# Patient Record
Sex: MALE | Race: WHITE | NOT HISPANIC OR LATINO | URBAN - METROPOLITAN AREA
[De-identification: names, ages, dates, MRNs, and addresses within clinical notes are randomized per-mention and may not be internally consistent; named-entity substitution may affect disease eponyms.]

---

## 2020-07-20 ENCOUNTER — TELEPHONE ENCOUNTER (OUTPATIENT)
Dept: URBAN - METROPOLITAN AREA CLINIC 35 | Facility: CLINIC | Age: 60
End: 2020-07-20

## 2020-07-20 ENCOUNTER — OFFICE VISIT (OUTPATIENT)
Dept: URBAN - METROPOLITAN AREA CLINIC 33 | Facility: CLINIC | Age: 60
End: 2020-07-20

## 2020-07-20 VITALS
SYSTOLIC BLOOD PRESSURE: 142 MMHG | OXYGEN SATURATION: 97 % | BODY MASS INDEX: 28.44 KG/M2 | WEIGHT: 210 LBS | DIASTOLIC BLOOD PRESSURE: 88 MMHG | TEMPERATURE: 97 F | HEIGHT: 72 IN | HEART RATE: 76 BPM | RESPIRATION RATE: 17 BRPM

## 2020-07-20 RX ORDER — AMLODIPINE BESYLATE 5 MG/1
1 TABLET TABLET ORAL ONCE A DAY
Qty: 30 | Status: ACTIVE | COMMUNITY

## 2020-07-20 NOTE — HPI-MIGRATED HPI
;   ;     Cirrhosis : Patient presents today at the request of PCP Dr. Nolasco for a consultation for  cirrhosis of liver.   Liver biopsy performed at [facility] ordered by GI Dr. Shane Dutton and performed on (7/31/2019) with findings consistent with that of cirrhosis and moderate steatosis.  The overall findings in the biopsy are suggestive of a toxic/metabolic pattern.  Plasma cells arise probably due to autoimmune or infectious components, although piecemeal necrosis or prominent activity is not seen.    He began taking Uloric 8 years ago and labs were taken every 6 months noting liver enzymes continued to elevate.  He discontinued the Uloric and liver enzymes decreased.  Complications of liver disease:  Jaundice: No Hepatic Encephalopathy: No Ascites: No Spontaneous Bacterial Peritonitis: No Variceal Hemorrhage: No Portal Vein Thrombosis: No Muscle Mass Loss: No Weight loss: No Sleep Habits: are good Sleep Habits    EGD (11/14/2019) Trace esophageal varices in the distal third of esophagus. All columns completely flattened with insufflation. No high risk stigmata. Mild nonerosive gastritis .  ;   Elevated Liver Enzymes : Patient admits a 8 year history of elevated liver enzymes which has fluctuated with the treatment of Gout with Uloric.  He began taking Uloric 8 years ago and labs were taken every 6 months noting liver enzymes continued to elevate.  He discontinued the Uloric and liver enzymes decreased.   Lost recent labs completed with PCP on (6/18/2020) CMP: AST (High) 41, ALT (High) 55. Also Amylase (High) 120, Lipase (High) 297.   Patient currently denies jaundice, chills, RUQ pains, dizziness, easy bruising, or increase fatigue.     RISK FACTORS:   Admit Alcohol use over the course of 8-10 years he would drink at least 6 drinks on the weekends consisting of Bud light, (Burbon or vodka).  Over the past 10 years he will drink less than 2 per month   - beer   Denies drugs, travel outside the [place], NSAID use, protein supplements, tattoos, piercings,  service, blood transfusion, family history of liver disease or cancer,  personal exposure to liver diseases, long term, rehab. ;

## 2020-07-24 ENCOUNTER — TELEPHONE ENCOUNTER (OUTPATIENT)
Dept: URBAN - METROPOLITAN AREA CLINIC 35 | Facility: CLINIC | Age: 60
End: 2020-07-24

## 2020-08-04 LAB
AFP-L3: (no result)
AFP: 4.9
ALBUMIN/GLOBULIN RATIO: 1.1
ALBUMIN: 4.2
ALKALINE PHOSPHATASE: 68
ALT: 36
ANA PATTERN: (no result)
ANA PATTERN: (no result)
ANA SCREEN, IFA: POSITIVE
ANA TITER: (no result)
ANA TITER: (no result)
AST: 23
BILIRUBIN, TOTAL: 0.9
BUN/CREATININE RATIO: (no result)
CALCIUM: 9.2
CARBON DIOXIDE: 23
CHLORIDE: 106
CREATININE: 1.1
EGFR AFRICAN AMERICAN: 85
EGFR NON-AFR. AMERICAN: 74
GLOBULIN: 4
GLUCOSE: 83
IMMUNOGLOBULIN A: 288
IMMUNOGLOBULIN G: 2434
IMMUNOGLOBULIN M: 127
INR: 1
LKM-1 ANTIBODY (IGG): <=20
MITOCHONDRIAL AB SCREEN: NEGATIVE
POTASSIUM: 4
PROTEIN, TOTAL: 8.2
PT: 10.2
SMOOTH MUSCLE AB SCREEN: NEGATIVE
SODIUM: 138
SOLUBLE LIVER ANTIGEN (SLA) AUTOANTIBODY: <20.1
UREA NITROGEN (BUN): 20

## 2020-08-17 ENCOUNTER — OFFICE VISIT (OUTPATIENT)
Dept: URBAN - METROPOLITAN AREA CLINIC 33 | Facility: CLINIC | Age: 60
End: 2020-08-17

## 2020-08-17 VITALS
BODY MASS INDEX: 29.12 KG/M2 | DIASTOLIC BLOOD PRESSURE: 100 MMHG | HEIGHT: 72 IN | HEART RATE: 69 BPM | WEIGHT: 215 LBS | TEMPERATURE: 96.8 F | SYSTOLIC BLOOD PRESSURE: 140 MMHG

## 2020-08-17 PROBLEM — 274774002 ELEVATED LEVELS OF TRANSAMINASE & LACTIC ACID DEHYDROGENASE: Status: ACTIVE | Noted: 2020-07-26

## 2020-08-17 RX ORDER — AMLODIPINE BESYLATE 5 MG/1
1 TABLET TABLET ORAL ONCE A DAY
Qty: 30 | Status: ACTIVE | COMMUNITY

## 2020-08-17 NOTE — HPI-MIGRATED HPI
;   ;     Cirrhosis : Patient presents today to review labs and follow up of cirrhosis of liver.   Jaundice: No Hepatic Encephalopathy: No Ascites: No Spontaneous Bacterial Peritonitis: No Variceal Hemorrhage: No Portal Vein Thrombosis: No Muscle Mass Loss: No Weight loss: No Sleep Habits: are good Sleep Habits    MELD Score: 7 (7/29/2020)  He does take prednisone  and advil -2 ,once every few months for gout flares , last episode was on 8/15/2020  Last visit (07/20/2020) Patient presents today at the request of PCP Dr. Nolasco for a consultation for  cirrhosis of liver.   Liver biopsy performed at [facility] ordered by GI Dr. Shane Dutton and performed on (7/31/2019) with findings consistent with that of cirrhosis and moderate steatosis.  The overall findings in the biopsy are suggestive of a toxic/metabolic pattern.  Plasma cells arise probably due to autoimmune or infectious components, although piecemeal necrosis or prominent activity is not seen.    He began taking Uloric 8 years ago and labs were taken every 6 months noting liver enzymes continued to elevate.  He discontinued the Uloric and liver enzymes decreased.  Complications of liver disease:  Jaundice: No Hepatic Encephalopathy: No Ascites: No Spontaneous Bacterial Peritonitis: No Variceal Hemorrhage: No Portal Vein Thrombosis: No Muscle Mass Loss: No Weight loss: No Sleep Habits: are good Sleep Habits    EGD (11/14/2019) Trace esophageal varices in the distal third of esophagus. All columns completely flattened with insufflation. No high risk stigmata. Mild nonerosive gastritis .  ;   Elevated Liver Enzymes : Patient currently denies jaundice, chills, RUQ pains, dizziness, easy bruising, fatigue.   He currently reports 1-2 bowel movements a day with no strain. His stools are formed with no blood or mucus present at this time.   RISK FACTORS: Denies drug use, travel outside the [place], NSAIDs, protein supplements, tattoos, piercings,  service, blood transfusion, family history of liver disease or cancer, personal exposure to liver diseases, [facility], rehab.  Admits occasional alcohol use.   Last visit (07/20/2020)Patient admits a 8 year history of elevated liver enzymes which has fluctuated with the treatment of Gout with Uloric.  He began taking Uloric 8 years ago and labs were taken every 6 months noting liver enzymes continued to elevate.  He discontinued the Uloric and liver enzymes decreased.   Lost recent labs completed with PCP on (6/18/2020) CMP: AST (High) 41, ALT (High) 55. Also Amylase (High) 120, Lipase (High) 297.   Patient currently denies jaundice, chills, RUQ pains, dizziness, easy bruising, or increase fatigue.     RISK FACTORS:   Admit Alcohol use over the course of 8-10 years he would drink at least 6 drinks on the weekends consisting of Bud light, (Burbon or vodka).  Over the past 10 years he will drink less than 2 per month   - beer   Denies drugs, travel outside the [place], NSAID use, protein supplements, tattoos, piercings,  service, blood transfusion, family history of liver disease or cancer,  personal exposure to liver diseases, [facility], rehab. ;

## 2020-11-12 ENCOUNTER — TELEPHONE ENCOUNTER (OUTPATIENT)
Dept: URBAN - METROPOLITAN AREA CLINIC 35 | Facility: CLINIC | Age: 60
End: 2020-11-12

## 2020-11-16 ENCOUNTER — TELEPHONE ENCOUNTER (OUTPATIENT)
Dept: URBAN - METROPOLITAN AREA CLINIC 35 | Facility: CLINIC | Age: 60
End: 2020-11-16

## 2020-11-16 ENCOUNTER — OFFICE VISIT (OUTPATIENT)
Dept: URBAN - METROPOLITAN AREA CLINIC 33 | Facility: CLINIC | Age: 60
End: 2020-11-16

## 2020-11-16 VITALS
HEART RATE: 67 BPM | HEIGHT: 72 IN | BODY MASS INDEX: 29.12 KG/M2 | RESPIRATION RATE: 17 BRPM | DIASTOLIC BLOOD PRESSURE: 80 MMHG | SYSTOLIC BLOOD PRESSURE: 140 MMHG | TEMPERATURE: 96.3 F | OXYGEN SATURATION: 97 % | WEIGHT: 215 LBS

## 2020-11-16 RX ORDER — AMLODIPINE BESYLATE 5 MG/1
1 TABLET TABLET ORAL ONCE A DAY
Qty: 30 | Status: ACTIVE | COMMUNITY

## 2020-11-16 RX ORDER — COLCHICINE 0.6 MG/1
1 TABLET TABLET, FILM COATED ORAL
Qty: 15 | Status: ACTIVE | COMMUNITY

## 2020-11-16 RX ORDER — FEBUXOSTAT 40 MG/1
1 TABLET TABLET ORAL ONCE A DAY
Qty: 30 | Status: ACTIVE | COMMUNITY

## 2020-11-16 NOTE — HPI-MIGRATED HPI
;   ;   ;     Cirrhosis : Patient presents today for a follow up of Cirrhosis.   Patient had consultation with Dr CANDY Mojica, [facility] for evaluating the etiology of Cirrhosis on 9/15/2020 with plan noting vaccination's indicated if not up to date pneumococcus, influenza (annually) diphtheria, pertussis and tetanus.  Nutritional education, diet modification 2 gram sodium diet, discuss avoiding raw seafood/shellfish; lean protein intake encouraged. No NSAID's; Tylenol less than 2 grams/daily preferred for pain management.  Will review  slides from Liver biopsy performed on (7/31/2019)   MELD Score: 9 (9/15/2020) [place] CARROLL Fibrosis Stage 4  (9/28/2020) [place]   Complications of liver disease: Jaundice: No Hepatic Encephalopathy: No Ascites: No Spontaneous Bacterial Peritonitis: No Variceal Hemorrhage: No Portal Vein Thrombosis: No Muscle Mass Loss: No Weight loss: No Sleep Habits: are good, sleeping 6 consecutive hours per night.  Admit a history of heavy alcohol use with drinking a 24 pk of beer on the weekends and a DUI in 1999/2000.  Quit drinking alcohol 5 years ago, then had a few beers in Jan. 2020.     MELD Score: 8 (INR from 9/15/2020, all others from 10/26/2020)   Last visit (08/17/2020): Patient presents today to review labs and follow up of cirrhosis of liver.   Jaundice: No Hepatic Encephalopathy: No Ascites: No Spontaneous Bacterial Peritonitis: No Variceal Hemorrhage: No Portal Vein Thrombosis: No Muscle Mass Loss: No Weight loss: No Sleep Habits: are good Sleep Habits    MELD Score: 7 (7/29/2020)  He does take prednisone  and Advil -2 ,once every few months for gout flares , last episode was on 8/15/2020  Last visit (07/20/2020) Patient presents today at the request of PCP Dr. Nolasco for a consultation for  cirrhosis of liver.   Liver biopsy performed at [facility] ordered by GI Dr. Shane Dutton and performed on (7/31/2019) with findings consistent with that of cirrhosis and moderate steatosis.  The overall findings in the biopsy are suggestive of a toxic/metabolic pattern.  Plasma cells arise probably due to autoimmune or infectious components, although piecemeal necrosis or prominent activity is not seen.    He began taking Uloric 8 years ago and labs were taken every 6 months noting liver enzymes continued to elevate.  He discontinued the Uloric and liver enzymes decreased.  Complications of liver disease:  Jaundice: No Hepatic Encephalopathy: No Ascites: No Spontaneous Bacterial Peritonitis: No Variceal Hemorrhage: No Portal Vein Thrombosis: No Muscle Mass Loss: No Weight loss: No Sleep Habits: are good Sleep Habits    EGD (11/14/2019) Trace esophageal varices in the distal third of esophagus. All columns completely flattened with insufflation. No high risk stigmata. Mild nonerosive gastritis .  ;   Change in Bowel habits : Admit change in bowel habits over the past week.  Described as loose and watery evacuations 3-4 times a day with associated fecal urgency.  He took Imodium 3 QD on Saturday with relief.  Since then he has had 2 formed to loose bowel movements per day and no longer experiencing fecal urgency.   Previous bowel habits were normal and formed.   He denies recently drinking lake or well water, any changes in their medications, or taking any antibiotics in the last 6 months.;   Elevated Liver Enzymes : Patient stated that he had labs completed with Dr. Frazier's office 10/26/2020 CMP (WNL)  AST26, ALT 41, Alk phos 98, Total bilirubin 0.7   Patient currently denies jaundice, chills, RUQ pains, dizziness, easy bruising, or increase fatigue.    Last vsit: (8/17/2020): Patient currently denies jaundice, chills, RUQ pains, dizziness, easy bruising, fatigue.   He currently reports 1-2 bowel movements a day with no strain. His stools are formed with no blood or mucus present at this time.   RISK FACTORS: Denies drug use, travel outside the [place], NSAIDs, protein supplements, tattoos, piercings,  service, blood transfusion, family history of liver disease or cancer, personal exposure to liver diseases, [facility], rehab.  Admits occasional alcohol use.   Last visit (07/20/2020)Patient admits a 8 year history of elevated liver enzymes which has fluctuated with the treatment of Gout with Uloric.  He began taking Uloric 8 years ago and labs were taken every 6 months noting liver enzymes continued to elevate.  He discontinued the Uloric and liver enzymes decreased.   Lost recent labs completed with PCP on (6/18/2020) CMP: AST (High) 41, ALT (High) 55. Also Amylase (High) 120, Lipase (High) 297.   Patient currently denies jaundice, chills, RUQ pains, dizziness, easy bruising, or increase fatigue.     RISK FACTORS:   Admit Alcohol use over the course of 8-10 years he would drink at least 6 drinks on the weekends consisting of Bud light, (Burbon or vodka).  Over the past 10 years he will drink less than 2 per month   - beer   Denies drugs, travel outside the [place], NSAID use, protein supplements, tattoos, piercings,  service, blood transfusion, family history of liver disease or cancer,  personal exposure to liver diseases, [facility], rehab. ;

## 2020-11-17 ENCOUNTER — LAB OUTSIDE AN ENCOUNTER (OUTPATIENT)
Dept: URBAN - METROPOLITAN AREA CLINIC 35 | Facility: CLINIC | Age: 60
End: 2020-11-17

## 2020-11-21 LAB
GASTROINTESTINAL PATHOGEN: (no result)
H. PYLORI (EIA) - QDX: NEGATIVE
PANCREATICELASTASE ELISA, STOOL: (no result)

## 2020-11-23 ENCOUNTER — TELEPHONE ENCOUNTER (OUTPATIENT)
Dept: URBAN - METROPOLITAN AREA CLINIC 35 | Facility: CLINIC | Age: 60
End: 2020-11-23

## 2020-11-23 PROBLEM — 707724006: Status: ACTIVE | Noted: 2020-11-23

## 2020-12-15 ENCOUNTER — TELEPHONE ENCOUNTER (OUTPATIENT)
Dept: URBAN - METROPOLITAN AREA CLINIC 35 | Facility: CLINIC | Age: 60
End: 2020-12-15

## 2021-01-11 ENCOUNTER — OFFICE VISIT (OUTPATIENT)
Dept: URBAN - METROPOLITAN AREA CLINIC 33 | Facility: CLINIC | Age: 61
End: 2021-01-11

## 2021-01-11 ENCOUNTER — LAB OUTSIDE AN ENCOUNTER (OUTPATIENT)
Dept: URBAN - METROPOLITAN AREA CLINIC 33 | Facility: CLINIC | Age: 61
End: 2021-01-11

## 2021-01-11 ENCOUNTER — TELEPHONE ENCOUNTER (OUTPATIENT)
Dept: URBAN - METROPOLITAN AREA CLINIC 35 | Facility: CLINIC | Age: 61
End: 2021-01-11

## 2021-01-11 VITALS — BODY MASS INDEX: 29.12 KG/M2 | HEIGHT: 72 IN | WEIGHT: 215 LBS | TEMPERATURE: 97.8 F

## 2021-01-11 RX ORDER — THIAMINE HCL 100 MG
1 TABLET WITH A MEAL TABLET ORAL ONCE A DAY
Qty: 30 | Status: ACTIVE | COMMUNITY

## 2021-01-11 RX ORDER — FEBUXOSTAT 40 MG/1
1 TABLET TABLET ORAL ONCE A DAY
Qty: 30 | Status: ACTIVE | COMMUNITY

## 2021-01-11 RX ORDER — AMLODIPINE BESYLATE 5 MG/1
1 TABLET TABLET ORAL ONCE A DAY
Qty: 30 | Status: ACTIVE | COMMUNITY

## 2021-01-11 RX ORDER — COLCHICINE 0.6 MG/1
1 TABLET TABLET, FILM COATED ORAL
Qty: 15 | Status: ACTIVE | COMMUNITY

## 2021-01-11 NOTE — HPI-MIGRATED HPI
;   ;   ;     Cirrhosis : Patient presents today for a follow up of Cirrhosis. He continue to deny alcohol use.  He has stopped the use of  Vitamin E.   He continue to take Uloric, since patient declined IV treatment.   MELD Score: 8 (INR From 8/4/2020)    Complications of liver disease: Jaundice: No Hepatic Encephalopathy: No Ascites: No Spontaneous Bacterial Peritonitis: No Variceal Hemorrhage: No Portal Vein Thrombosis: No Muscle Mass Loss: No Weight loss: No Sleep Habits: are good, sleeping 6 consecutive hours per night.     Last visit (11/16/2020) Patient presents today for a follow up of Cirrhosis.   Patient had consultation with Dr CANDY Mojica, [facility] for evaluating the etiology of Cirrhosis on 9/15/2020 with plan noting vaccination's indicated if not up to date pneumococcus, influenza (annually) diphtheria, pertussis and tetanus.  Nutritional education, diet modification 2 gram sodium diet, discuss avoiding raw seafood/shellfish; lean protein intake encouraged. No NSAID's; Tylenol less than 2 grams/daily preferred for pain management.  Will review  slides from Liver biopsy performed on (7/31/2019)   MELD Score: 9 (9/15/2020) [place] CARROLL Fibrosis Stage 4  (9/28/2020) [place]   Complications of liver disease: Jaundice: No Hepatic Encephalopathy: No Ascites: No Spontaneous Bacterial Peritonitis: No Variceal Hemorrhage: No Portal Vein Thrombosis: No Muscle Mass Loss: No Weight loss: No Sleep Habits: are good, sleeping 6 consecutive hours per night.  Admit a history of heavy alcohol use with drinking a 24 pk of beer on the weekends and a DUI in 1999/2000.  Quit drinking alcohol 5 years ago, then had a few beers in Jan. 2020.     MELD Score: 8 (INR from 9/15/2020, all others from 10/26/2020)   Last visit (08/17/2020): Patient presents today to review labs and follow up of cirrhosis of liver.   Jaundice: No Hepatic Encephalopathy: No Ascites: No Spontaneous Bacterial Peritonitis: No Variceal Hemorrhage: No Portal Vein Thrombosis: No Muscle Mass Loss: No Weight loss: No Sleep Habits: are good Sleep Habits    MELD Score: 7 (7/29/2020)  He does take prednisone  and Advil -2 ,once every few months for gout flares , last episode was on 8/15/2020  Last visit (07/20/2020) Patient presents today at the request of PCP Dr. Nolasco for a consultation for  cirrhosis of liver.   Liver biopsy performed at [facility] ordered by GI Dr. Shane Dutton and performed on (7/31/2019) with findings consistent with that of cirrhosis and moderate steatosis.  The overall findings in the biopsy are suggestive of a toxic/metabolic pattern.  Plasma cells arise probably due to autoimmune or infectious components, although piecemeal necrosis or prominent activity is not seen.    He began taking Uloric 8 years ago and labs were taken every 6 months noting liver enzymes continued to elevate.  He discontinued the Uloric and liver enzymes decreased.  Complications of liver disease:  Jaundice: No Hepatic Encephalopathy: No Ascites: No Spontaneous Bacterial Peritonitis: No Variceal Hemorrhage: No Portal Vein Thrombosis: No Muscle Mass Loss: No Weight loss: No Sleep Habits: are good Sleep Habits    EGD (11/14/2019) Trace esophageal varices in the distal third of esophagus. All columns completely flattened with insufflation. No high risk stigmata. Mild nonerosive gastritis .  ;   Change in Bowel habits : Currently admit 1 normal and formed bowel movements per day. Denies melena, blood, or mucus in stools.  Denies  pruritus ani, or rectal pain.   Patient had stool studies completed on 11/17/2020 with normal results.    Last visit (11/16/2020) Admit change in bowel habits over the past week.  Described as loose and watery evacuations 3-4 times a day with associated fecal urgency.  He took Imodium 3 QD on Saturday with relief.  Since then he has had 2 formed to loose bowel movements per day and no longer experiencing fecal urgency.   Previous bowel habits were normal and formed.   He denies recently drinking lake or well water, any changes in their medications, or taking any antibiotics in the last 6 months.;   Elevated Liver Enzymes : Denies any lab since with Dr. Frazier's office on 10/26/2020 Patient currently denies jaundice, chills, RUQ pains, dizziness, easy bruising, or increase fatigue.   Last visit (11/16/2020) Patient stated that he had labs completed with Dr. Frazier's office 10/26/2020 CMP (WNL)  AST26, ALT 41, Alk phos 98, Total bilirubin 0.7   Patient currently denies jaundice, chills, RUQ pains, dizziness, easy bruising, or increase fatigue.    Last vsit: (8/17/2020): Patient currently denies jaundice, chills, RUQ pains, dizziness, easy bruising, fatigue.   He currently reports 1-2 bowel movements a day with no strain. His stools are formed with no blood or mucus present at this time.   RISK FACTORS: Denies drug use, travel outside the [place], NSAIDs, protein supplements, tattoos, piercings,  service, blood transfusion, family history of liver disease or cancer, personal exposure to liver diseases, custodial, rehab.  Admits occasional alcohol use.   Last visit (07/20/2020)Patient admits a 8 year history of elevated liver enzymes which has fluctuated with the treatment of Gout with Uloric.  He began taking Uloric 8 years ago and labs were taken every 6 months noting liver enzymes continued to elevate.  He discontinued the Uloric and liver enzymes decreased.   Lost recent labs completed with PCP on (6/18/2020) CMP: AST (High) 41, ALT (High) 55. Also Amylase (High) 120, Lipase (High) 297.   Patient currently denies jaundice, chills, RUQ pains, dizziness, easy bruising, or increase fatigue.     RISK FACTORS:   Admit Alcohol use over the course of 8-10 years he would drink at least 6 drinks on the weekends consisting of Bud light, (Burbon or vodka).  Over the past 10 years he will drink less than 2 per month   - beer   Denies drugs, travel outside the [place], NSAID use, protein supplements, tattoos, piercings,  service, blood transfusion, family history of liver disease or cancer,  personal exposure to liver diseases, custodial, rehab. ;

## 2021-02-10 LAB
ALBUMIN/GLOBULIN RATIO: 1.3
ALBUMIN: 4.1
ALKALINE PHOSPHATASE: 126
ALT: 73
AST: 35
BILIRUBIN, TOTAL: 0.8
BUN/CREATININE RATIO: (no result)
CALCIUM: 9.4
CARBON DIOXIDE: 28
CHLORIDE: 104
CREATININE: 1.2
EGFR AFRICAN AMERICAN: 76
EGFR NON-AFR. AMERICAN: 66
GLOBULIN: 3.1
GLUCOSE: 98
INR: 1
POTASSIUM: 4
PROTEIN, TOTAL: 7.2
PT: 10.5
SODIUM: 140
UREA NITROGEN (BUN): 15

## 2021-02-12 ENCOUNTER — TELEPHONE ENCOUNTER (OUTPATIENT)
Dept: URBAN - METROPOLITAN AREA CLINIC 35 | Facility: CLINIC | Age: 61
End: 2021-02-12

## 2021-07-01 ENCOUNTER — LAB OUTSIDE AN ENCOUNTER (OUTPATIENT)
Dept: URBAN - METROPOLITAN AREA CLINIC 33 | Facility: CLINIC | Age: 61
End: 2021-07-01

## 2021-07-14 ENCOUNTER — TELEPHONE ENCOUNTER (OUTPATIENT)
Dept: URBAN - METROPOLITAN AREA CLINIC 35 | Facility: CLINIC | Age: 61
End: 2021-07-14

## 2021-07-20 ENCOUNTER — TELEPHONE ENCOUNTER (OUTPATIENT)
Dept: URBAN - METROPOLITAN AREA CLINIC 35 | Facility: CLINIC | Age: 61
End: 2021-07-20

## 2021-07-22 ENCOUNTER — OFFICE VISIT (OUTPATIENT)
Dept: URBAN - METROPOLITAN AREA CLINIC 33 | Facility: CLINIC | Age: 61
End: 2021-07-22

## 2021-08-11 ENCOUNTER — TELEPHONE ENCOUNTER (OUTPATIENT)
Dept: URBAN - METROPOLITAN AREA CLINIC 35 | Facility: CLINIC | Age: 61
End: 2021-08-11

## 2021-08-12 ENCOUNTER — OFFICE VISIT (OUTPATIENT)
Dept: URBAN - METROPOLITAN AREA CLINIC 33 | Facility: CLINIC | Age: 61
End: 2021-08-12

## 2021-08-12 ENCOUNTER — LAB OUTSIDE AN ENCOUNTER (OUTPATIENT)
Dept: URBAN - METROPOLITAN AREA CLINIC 35 | Facility: CLINIC | Age: 61
End: 2021-08-12

## 2021-08-12 RX ORDER — AMLODIPINE BESYLATE 5 MG/1
1 TABLET TABLET ORAL ONCE A DAY
Qty: 30 | Status: ACTIVE | COMMUNITY

## 2021-08-12 RX ORDER — THIAMINE HCL 100 MG
1 TABLET WITH A MEAL TABLET ORAL ONCE A DAY
Qty: 30 | Status: ACTIVE | COMMUNITY

## 2021-08-12 RX ORDER — FEBUXOSTAT 40 MG/1
1 TABLET TABLET ORAL ONCE A DAY
Qty: 30 | Status: ACTIVE | COMMUNITY

## 2021-08-12 RX ORDER — COLCHICINE 0.6 MG/1
1 TABLET TABLET, FILM COATED ORAL
Qty: 15 | Status: ACTIVE | COMMUNITY

## 2021-08-12 NOTE — HPI-MIGRATED HPI
;   ;   ;     Cirrhosis :     Last visit (1/11/2021) Patient presents today for a follow up of Cirrhosis. He continue to deny alcohol use.  He has stopped the use of  Vitamin E.   He continue to take Uloric, since patient declined IV treatment.   MELD Score: 8 (INR From 8/4/2020)    Complications of liver disease: Jaundice: No Hepatic Encephalopathy: No Ascites: No Spontaneous Bacterial Peritonitis: No Variceal Hemorrhage: No Portal Vein Thrombosis: No Muscle Mass Loss: No Weight loss: No Sleep Habits: are good, sleeping 6 consecutive hours per night.     Last visit (11/16/2020) Patient presents today for a follow up of Cirrhosis.   Patient had consultation with Dr CANDY Mojica, [facility] for evaluating the etiology of Cirrhosis on 9/15/2020 with plan noting vaccination's indicated if not up to date pneumococcus, influenza (annually) diphtheria, pertussis and tetanus.  Nutritional education, diet modification 2 gram sodium diet, discuss avoiding raw seafood/shellfish; lean protein intake encouraged. No NSAID's; Tylenol less than 2 grams/daily preferred for pain management.  Will review  slides from Liver biopsy performed on (7/31/2019)   MELD Score: 9 (9/15/2020) [place] CARROLL Fibrosis Stage 4  (9/28/2020) [place]   Complications of liver disease: Jaundice: No Hepatic Encephalopathy: No Ascites: No Spontaneous Bacterial Peritonitis: No Variceal Hemorrhage: No Portal Vein Thrombosis: No Muscle Mass Loss: No Weight loss: No Sleep Habits: are good, sleeping 6 consecutive hours per night.  Admit a history of heavy alcohol use with drinking a 24 pk of beer on the weekends and a DUI in 1999/2000.  Quit drinking alcohol 5 years ago, then had a few beers in Jan. 2020.     MELD Score: 8 (INR from 9/15/2020, all others from 10/26/2020)   Last visit (08/17/2020): Patient presents today to review labs and follow up of cirrhosis of liver.   Jaundice: No Hepatic Encephalopathy: No Ascites: No Spontaneous Bacterial Peritonitis: No Variceal Hemorrhage: No Portal Vein Thrombosis: No Muscle Mass Loss: No Weight loss: No Sleep Habits: are good Sleep Habits    MELD Score: 7 (7/29/2020)  He does take prednisone  and Advil -2 ,once every few months for gout flares , last episode was on 8/15/2020  Last visit (07/20/2020) Patient presents today at the request of PCP Dr. Nolasco for a consultation for  cirrhosis of liver.   Liver biopsy performed at [facility] ordered by GI Dr. Shane Dutton and performed on (7/31/2019) with findings consistent with that of cirrhosis and moderate steatosis.  The overall findings in the biopsy are suggestive of a toxic/metabolic pattern.  Plasma cells arise probably due to autoimmune or infectious components, although piecemeal necrosis or prominent activity is not seen.    He began taking Uloric 8 years ago and labs were taken every 6 months noting liver enzymes continued to elevate.  He discontinued the Uloric and liver enzymes decreased.  Complications of liver disease:  Jaundice: No Hepatic Encephalopathy: No Ascites: No Spontaneous Bacterial Peritonitis: No Variceal Hemorrhage: No Portal Vein Thrombosis: No Muscle Mass Loss: No Weight loss: No Sleep Habits: are good Sleep Habits    EGD (11/14/2019) Trace esophageal varices in the distal third of esophagus. All columns completely flattened with insufflation. No high risk stigmata. Mild nonerosive gastritis .;   Change in Bowel habits :     Last visit (1/11/2021) Currently admit 1 normal and formed bowel movements per day. Denies melena, blood, or mucus in stools.  Denies  pruritus ani, or rectal pain.   Patient had stool studies completed on 11/17/2020 with normal results.    Last visit (11/16/2020) Admit change in bowel habits over the past week.  Described as loose and watery evacuations 3-4 times a day with associated fecal urgency.  He took Imodium 3 QD on Saturday with relief.  Since then he has had 2 formed to loose bowel movements per day and no longer experiencing fecal urgency.   Previous bowel habits were normal and formed.   He denies recently drinking lake or well water, any changes in their medications, or taking any antibiotics in the last 6 months.;   Elevated Liver Enzymes :     Last visit (1/11/2021) Denies any lab since with Dr. Frazier's office on 10/26/2020 Patient currently denies jaundice, chills, RUQ pains, dizziness, easy bruising, or increase fatigue.   Last visit (11/16/2020) Patient stated that he had labs completed with Dr. Frazier's office 10/26/2020 CMP (WNL)  AST26, ALT 41, Alk phos 98, Total bilirubin 0.7   Patient currently denies jaundice, chills, RUQ pains, dizziness, easy bruising, or increase fatigue.    Last vsit: (8/17/2020): Patient currently denies jaundice, chills, RUQ pains, dizziness, easy bruising, fatigue.   He currently reports 1-2 bowel movements a day with no strain. His stools are formed with no blood or mucus present at this time.   RISK FACTORS: Denies drug use, travel outside the [place], NSAIDs, protein supplements, tattoos, piercings,  service, blood transfusion, family history of liver disease or cancer, personal exposure to liver diseases, correction, rehab.  Admits occasional alcohol use.   Last visit (07/20/2020)Patient admits a 8 year history of elevated liver enzymes which has fluctuated with the treatment of Gout with Uloric.  He began taking Uloric 8 years ago and labs were taken every 6 months noting liver enzymes continued to elevate.  He discontinued the Uloric and liver enzymes decreased.   Lost recent labs completed with PCP on (6/18/2020) CMP: AST (High) 41, ALT (High) 55. Also Amylase (High) 120, Lipase (High) 297.   Patient currently denies jaundice, chills, RUQ pains, dizziness, easy bruising, or increase fatigue.     RISK FACTORS:   Admit Alcohol use over the course of 8-10 years he would drink at least 6 drinks on the weekends consisting of Bud light, (Burbon or vodka).  Over the past 10 years he will drink less than 2 per month   - beer   Denies drugs, travel outside the [place], NSAID use, protein supplements, tattoos, piercings,  service, blood transfusion, family history of liver disease or cancer,  personal exposure to liver diseases, correction, rehab.;

## 2021-08-14 LAB
AFP-L3: (no result)
AFP: 5.2
ALBUMIN/GLOBULIN RATIO: 1.1
ALBUMIN: 4.2
ALKALINE PHOSPHATASE: 103
ALT: 97
AST: 57
BILIRUBIN, TOTAL: 0.6
BUN/CREATININE RATIO: (no result)
CALCIUM: 9.4
CARBON DIOXIDE: 26
CHLORIDE: 105
CREATININE: 1.19
EGFR AFRICAN AMERICAN: 77
EGFR NON-AFR. AMERICAN: 66
GLOBULIN: 4
GLUCOSE: 95
INR: 1
POTASSIUM: 4.1
PROTEIN, TOTAL: 8.2
PT: 10.6
SODIUM: 138
UREA NITROGEN (BUN): 13

## 2021-08-23 ENCOUNTER — OFFICE VISIT (OUTPATIENT)
Dept: URBAN - METROPOLITAN AREA CLINIC 33 | Facility: CLINIC | Age: 61
End: 2021-08-23

## 2021-08-23 VITALS
BODY MASS INDEX: 29.66 KG/M2 | OXYGEN SATURATION: 97 % | WEIGHT: 219 LBS | HEART RATE: 68 BPM | SYSTOLIC BLOOD PRESSURE: 138 MMHG | HEIGHT: 72 IN | DIASTOLIC BLOOD PRESSURE: 90 MMHG

## 2021-08-23 RX ORDER — THIAMINE HCL 100 MG
1 TABLET WITH A MEAL TABLET ORAL ONCE A DAY
Qty: 30 | Status: ACTIVE | COMMUNITY

## 2021-08-23 RX ORDER — AMLODIPINE BESYLATE 5 MG/1
1 TABLET TABLET ORAL ONCE A DAY
Qty: 30 | Status: ACTIVE | COMMUNITY

## 2021-08-23 RX ORDER — COLCHICINE 0.6 MG/1
1 TABLET TABLET, FILM COATED ORAL
Qty: 15 | Status: ACTIVE | COMMUNITY

## 2021-08-23 RX ORDER — FEBUXOSTAT 40 MG/1
1 TABLET TABLET ORAL ONCE A DAY
Qty: 30 | Status: ACTIVE | COMMUNITY

## 2021-08-23 NOTE — HPI-MIGRATED HPI
;   ;   ;     Cirrhosis : Patient presents today for a follow up visit for Cirrhosis. He denies any associated symptoms at this time.   MELD Score: 8 (Labs from 8/12/2021)    Last visit (1/11/2021) Patient presents today for a follow up of Cirrhosis. He continue to deny alcohol use.  He has stopped the use of  Vitamin E.   He continue to take Uloric, since patient declined IV treatment.   MELD Score: 8 (INR From 8/4/2020)    Complications of liver disease: Jaundice: No Hepatic Encephalopathy: No Ascites: No Spontaneous Bacterial Peritonitis: No Variceal Hemorrhage: No Portal Vein Thrombosis: No Muscle Mass Loss: No Weight loss: No Sleep Habits: are good, sleeping 6 consecutive hours per night.     Last visit (11/16/2020) Patient presents today for a follow up of Cirrhosis.   Patient had consultation with Dr CANDY Mojica, [facility] for evaluating the etiology of Cirrhosis on 9/15/2020 with plan noting vaccination's indicated if not up to date pneumococcus, influenza (annually) diphtheria, pertussis and tetanus.  Nutritional education, diet modification 2 gram sodium diet, discuss avoiding raw seafood/shellfish; lean protein intake encouraged. No NSAID's; Tylenol less than 2 grams/daily preferred for pain management.  Will review  slides from Liver biopsy performed on (7/31/2019)   MELD Score: 9 (9/15/2020) [place] CARROLL Fibrosis Stage 4  (9/28/2020) [place]   Complications of liver disease: Jaundice: No Hepatic Encephalopathy: No Ascites: No Spontaneous Bacterial Peritonitis: No Variceal Hemorrhage: No Portal Vein Thrombosis: No Muscle Mass Loss: No Weight loss: No Sleep Habits: are good, sleeping 6 consecutive hours per night.  Admit a history of heavy alcohol use with drinking a 24 pk of beer on the weekends and a DUI in 1999/2000.  Quit drinking alcohol 5 years ago, then had a few beers in Jan. 2020.     MELD Score: 8 (INR from 9/15/2020, all others from 10/26/2020)   Last visit (08/17/2020): Patient presents today to review labs and follow up of cirrhosis of liver.   Jaundice: No Hepatic Encephalopathy: No Ascites: No Spontaneous Bacterial Peritonitis: No Variceal Hemorrhage: No Portal Vein Thrombosis: No Muscle Mass Loss: No Weight loss: No Sleep Habits: are good Sleep Habits    MELD Score: 7 (7/29/2020)  He does take prednisone  and Advil -2 ,once every few months for gout flares , last episode was on 8/15/2020  Last visit (07/20/2020) Patient presents today at the request of PCP Dr. Nolasco for a consultation for  cirrhosis of liver.   Liver biopsy performed at [facility] ordered by GI Dr. Shane Dutton and performed on (7/31/2019) with findings consistent with that of cirrhosis and moderate steatosis.  The overall findings in the biopsy are suggestive of a toxic/metabolic pattern.  Plasma cells arise probably due to autoimmune or infectious components, although piecemeal necrosis or prominent activity is not seen.    He began taking Uloric 8 years ago and labs were taken every 6 months noting liver enzymes continued to elevate.  He discontinued the Uloric and liver enzymes decreased.  Complications of liver disease:  Jaundice: No Hepatic Encephalopathy: No Ascites: No Spontaneous Bacterial Peritonitis: No Variceal Hemorrhage: No Portal Vein Thrombosis: No Muscle Mass Loss: No Weight loss: No Sleep Habits: are good Sleep Habits    EGD (11/14/2019) Trace esophageal varices in the distal third of esophagus. All columns completely flattened with insufflation. No high risk stigmata. Mild nonerosive gastritis .;   Change in Bowel habits : He denies his bowel habits have returned to normal since his last visit. He reports improvement since his last visit. Currently he reports 1 bowel movement a day without strain. His stools are formed without blood, mucus, or melena. Patient admits/denies any symptoms of rectal pain or pruritus ani.    Last visit (1/11/2021) Currently admit 1 normal and formed bowel movements per day. Denies melena, blood, or mucus in stools.  Denies  pruritus ani, or rectal pain.   Patient had stool studies completed on 11/17/2020 with normal results.    Last visit (11/16/2020) Admit change in bowel habits over the past week.  Described as loose and watery evacuations 3-4 times a day with associated fecal urgency.  He took Imodium 3 QD on Saturday with relief.  Since then he has had 2 formed to loose bowel movements per day and no longer experiencing fecal urgency.   Previous bowel habits were normal and formed.   He denies recently drinking lake or well water, any changes in their medications, or taking any antibiotics in the last 6 months.;   Elevated Liver Enzymes : Patient had labs completed with results listed below. He denies any symptoms of jaundice, chills, RUQ pains, dizziness, easy bruising, or increase fatigue.   Last visit (1/11/2021) Denies any lab since with Dr. Frazier's office on 10/26/2020 Patient currently denies jaundice, chills, RUQ pains, dizziness, easy bruising, or increase fatigue.   Last visit (11/16/2020) Patient stated that he had labs completed with Dr. Frazier's office 10/26/2020 CMP (WNL)  AST26, ALT 41, Alk phos 98, Total bilirubin 0.7   Patient currently denies jaundice, chills, RUQ pains, dizziness, easy bruising, or increase fatigue.    Last vsit: (8/17/2020): Patient currently denies jaundice, chills, RUQ pains, dizziness, easy bruising, fatigue.   He currently reports 1-2 bowel movements a day with no strain. His stools are formed with no blood or mucus present at this time.   RISK FACTORS: Denies drug use, travel outside the [place], NSAIDs, protein supplements, tattoos, piercings,  service, blood transfusion, family history of liver disease or cancer, personal exposure to liver diseases, skilled nursing, rehab.  Admits occasional alcohol use.   Last visit (07/20/2020)Patient admits a 8 year history of elevated liver enzymes which has fluctuated with the treatment of Gout with Uloric.  He began taking Uloric 8 years ago and labs were taken every 6 months noting liver enzymes continued to elevate.  He discontinued the Uloric and liver enzymes decreased.   Lost recent labs completed with PCP on (6/18/2020) CMP: AST (High) 41, ALT (High) 55. Also Amylase (High) 120, Lipase (High) 297.   Patient currently denies jaundice, chills, RUQ pains, dizziness, easy bruising, or increase fatigue.     RISK FACTORS:   Admit Alcohol use over the course of 8-10 years he would drink at least 6 drinks on the weekends consisting of Bud light, (Burbon or vodka).  Over the past 10 years he will drink less than 2 per month   - beer   Denies drugs, travel outside the [place], NSAID use, protein supplements, tattoos, piercings,  service, blood transfusion, family history of liver disease or cancer,  personal exposure to liver diseases, skilled nursing, rehab.;

## 2021-11-03 ENCOUNTER — OFFICE VISIT (OUTPATIENT)
Dept: URBAN - METROPOLITAN AREA SURGERY CENTER 8 | Facility: SURGERY CENTER | Age: 61
End: 2021-11-03

## 2021-11-14 ENCOUNTER — TELEPHONE ENCOUNTER (OUTPATIENT)
Dept: URBAN - METROPOLITAN AREA CLINIC 35 | Facility: CLINIC | Age: 61
End: 2021-11-14

## 2021-11-15 ENCOUNTER — OFFICE VISIT (OUTPATIENT)
Dept: URBAN - METROPOLITAN AREA CLINIC 33 | Facility: CLINIC | Age: 61
End: 2021-11-15

## 2021-11-15 NOTE — HPI-MIGRATED HPI
;   ;   ;     Cirrhosis : Patient        LAB: Pathology - EGD      LAB: COMPREHENSIVE METABOLIC PANEL (Ordered for 01/03/2022)      LAB: PROTHROMBIN TIME-INR (Ordered for 01/03/2022)      LAB: ALPHA-FETOPROTEIN (AFP) AND AFP-L3 (Ordered for 01/03/2022)      IMAGING: EGD- IH      IMAGING: US RUQ              Jan 2021- [facility]     Last visit (8/23/2021)  Patient presents today for a follow up visit for Cirrhosis. He denies any associated symptoms at this time.   MELD Score: 8 (Labs from 8/12/2021)    Last visit (1/11/2021) Patient presents today for a follow up of Cirrhosis. He continue to deny alcohol use.  He has stopped the use of  Vitamin E.   He continue to take Uloric, since patient declined IV treatment.   MELD Score: 8 (INR From 8/4/2020)    Complications of liver disease: Jaundice: No Hepatic Encephalopathy: No Ascites: No Spontaneous Bacterial Peritonitis: No Variceal Hemorrhage: No Portal Vein Thrombosis: No Muscle Mass Loss: No Weight loss: No Sleep Habits: are good, sleeping 6 consecutive hours per night.     Last visit (11/16/2020) Patient presents today for a follow up of Cirrhosis.   Patient had consultation with Dr CANDY Mojica, [facility] for evaluating the etiology of Cirrhosis on 9/15/2020 with plan noting vaccination's indicated if not up to date pneumococcus, influenza (annually) diphtheria, pertussis and tetanus.  Nutritional education, diet modification 2 gram sodium diet, discuss avoiding raw seafood/shellfish; lean protein intake encouraged. No NSAID's; Tylenol less than 2 grams/daily preferred for pain management.  Will review  slides from Liver biopsy performed on (7/31/2019)   MELD Score: 9 (9/15/2020) [place] CARROLL Fibrosis Stage 4  (9/28/2020) [place]   Complications of liver disease: Jaundice: No Hepatic Encephalopathy: No Ascites: No Spontaneous Bacterial Peritonitis: No Variceal Hemorrhage: No Portal Vein Thrombosis: No Muscle Mass Loss: No Weight loss: No Sleep Habits: are good, sleeping 6 consecutive hours per night.  Admit a history of heavy alcohol use with drinking a 24 pk of beer on the weekends and a DUI in 1999/2000.  Quit drinking alcohol 5 years ago, then had a few beers in Jan. 2020.     MELD Score: 8 (INR from 9/15/2020, all others from 10/26/2020)   Last visit (08/17/2020): Patient presents today to review labs and follow up of cirrhosis of liver.   Jaundice: No Hepatic Encephalopathy: No Ascites: No Spontaneous Bacterial Peritonitis: No Variceal Hemorrhage: No Portal Vein Thrombosis: No Muscle Mass Loss: No Weight loss: No Sleep Habits: are good Sleep Habits    MELD Score: 7 (7/29/2020)  He does take prednisone  and Advil -2 ,once every few months for gout flares , last episode was on 8/15/2020  Last visit (07/20/2020) Patient presents today at the request of PCP Dr. Nolasco for a consultation for  cirrhosis of liver.   Liver biopsy performed at [facility] ordered by GI Dr. Shane Dutton and performed on (7/31/2019) with findings consistent with that of cirrhosis and moderate steatosis.  The overall findings in the biopsy are suggestive of a toxic/metabolic pattern.  Plasma cells arise probably due to autoimmune or infectious components, although piecemeal necrosis or prominent activity is not seen.    He began taking Uloric 8 years ago and labs were taken every 6 months noting liver enzymes continued to elevate.  He discontinued the Uloric and liver enzymes decreased.  Complications of liver disease:  Jaundice: No Hepatic Encephalopathy: No Ascites: No Spontaneous Bacterial Peritonitis: No Variceal Hemorrhage: No Portal Vein Thrombosis: No Muscle Mass Loss: No Weight loss: No Sleep Habits: are good Sleep Habits    EGD (11/14/2019) Trace esophageal varices in the distal third of esophagus. All columns completely flattened with insufflation. No high risk stigmata. Mild nonerosive gastritis .;   Change in Bowel habits :    Last visit (8/23/2021)  He denies his bowel habits have returned to normal since his last visit. He reports improvement since his last visit. Currently he reports 1 bowel movement a day without strain. His stools are formed without blood, mucus, or melena. Patient admits/denies any symptoms of rectal pain or pruritus ani.    Last visit (1/11/2021) Currently admit 1 normal and formed bowel movements per day. Denies melena, blood, or mucus in stools.  Denies  pruritus ani, or rectal pain.   Patient had stool studies completed on 11/17/2020 with normal results.    Last visit (11/16/2020) Admit change in bowel habits over the past week.  Described as loose and watery evacuations 3-4 times a day with associated fecal urgency.  He took Imodium 3 QD on Saturday with relief.  Since then he has had 2 formed to loose bowel movements per day and no longer experiencing fecal urgency.   Previous bowel habits were normal and formed.   He denies recently drinking lake or well water, any changes in their medications, or taking any antibiotics in the last 6 months.;   Elevated Liver Enzymes :    Last visit (8/23/2021)  Patient had labs completed with results listed below. He denies any symptoms of jaundice, chills, RUQ pains, dizziness, easy bruising, or increase fatigue.   Last visit (1/11/2021) Denies any lab since with Dr. Frazier's office on 10/26/2020 Patient currently denies jaundice, chills, RUQ pains, dizziness, easy bruising, or increase fatigue.   Last visit (11/16/2020) Patient stated that he had labs completed with Dr. Frazier's office 10/26/2020 CMP (WNL)  AST26, ALT 41, Alk phos 98, Total bilirubin 0.7   Patient currently denies jaundice, chills, RUQ pains, dizziness, easy bruising, or increase fatigue.    Last vsit: (8/17/2020): Patient currently denies jaundice, chills, RUQ pains, dizziness, easy bruising, fatigue.   He currently reports 1-2 bowel movements a day with no strain. His stools are formed with no blood or mucus present at this time.   RISK FACTORS: Denies drug use, travel outside the [place], NSAIDs, protein supplements, tattoos, piercings,  service, blood transfusion, family history of liver disease or cancer, personal exposure to liver diseases, [facility], rehab.  Admits occasional alcohol use.   Last visit (07/20/2020)Patient admits a 8 year history of elevated liver enzymes which has fluctuated with the treatment of Gout with Uloric.  He began taking Uloric 8 years ago and labs were taken every 6 months noting liver enzymes continued to elevate.  He discontinued the Uloric and liver enzymes decreased.   Lost recent labs completed with PCP on (6/18/2020) CMP: AST (High) 41, ALT (High) 55. Also Amylase (High) 120, Lipase (High) 297.   Patient currently denies jaundice, chills, RUQ pains, dizziness, easy bruising, or increase fatigue.     RISK FACTORS:   Admit Alcohol use over the course of 8-10 years he would drink at least 6 drinks on the weekends consisting of Bud light, (Burbon or vodka).  Over the past 10 years he will drink less than 2 per month   - beer   Denies drugs, travel outside the [place], NSAID use, protein supplements, tattoos, piercings,  service, blood transfusion, family history of liver disease or cancer,  personal exposure to liver diseases, [facility], rehab.;

## 2021-12-01 ENCOUNTER — OFFICE VISIT (OUTPATIENT)
Dept: URBAN - METROPOLITAN AREA SURGERY CENTER 8 | Facility: SURGERY CENTER | Age: 61
End: 2021-12-01

## 2021-12-01 ENCOUNTER — TELEPHONE ENCOUNTER (OUTPATIENT)
Dept: URBAN - METROPOLITAN AREA CLINIC 35 | Facility: CLINIC | Age: 61
End: 2021-12-01

## 2021-12-01 RX ORDER — PANTOPRAZOLE SODIUM 40 MG/1
1 TABLET TABLET, DELAYED RELEASE ORAL ONCE A DAY
Qty: 90 TABLET | Refills: 0 | OUTPATIENT
Start: 2021-12-01

## 2021-12-14 ENCOUNTER — OFFICE VISIT (OUTPATIENT)
Dept: URBAN - METROPOLITAN AREA CLINIC 31 | Facility: CLINIC | Age: 61
End: 2021-12-14
Payer: COMMERCIAL

## 2021-12-14 VITALS
OXYGEN SATURATION: 97 % | WEIGHT: 220 LBS | BODY MASS INDEX: 29.8 KG/M2 | SYSTOLIC BLOOD PRESSURE: 138 MMHG | HEIGHT: 72 IN | DIASTOLIC BLOOD PRESSURE: 78 MMHG | HEART RATE: 84 BPM

## 2021-12-14 DIAGNOSIS — K74.69 OTHER CIRRHOSIS OF LIVER: ICD-10-CM

## 2021-12-14 DIAGNOSIS — K31.7 POLYP OF STOMACH AND DUODENUM: ICD-10-CM

## 2021-12-14 DIAGNOSIS — R19.4 CHANGE IN BOWEL HABIT: ICD-10-CM

## 2021-12-14 DIAGNOSIS — K29.30 CHRONIC SUPERFICIAL GASTRITIS WITHOUT BLEEDING: ICD-10-CM

## 2021-12-14 DIAGNOSIS — I85.00 ESOPHAGEAL VARICES WITHOUT BLEEDING: ICD-10-CM

## 2021-12-14 DIAGNOSIS — R79.89 OTHER SPECIFIED ABNORMAL FINDINGS OF BLOOD CHEMISTRY: ICD-10-CM

## 2021-12-14 DIAGNOSIS — K76.0 FATTY (CHANGE OF) LIVER, NOT ELSEWHERE CLASSIFIED: ICD-10-CM

## 2021-12-14 DIAGNOSIS — K21.00 GASTROESOPHAGEAL REFLUX DISEASE WITH ESOPHAGITIS WITHOUT HEMORRHAGE: ICD-10-CM

## 2021-12-14 PROBLEM — 88111009 CHANGE IN BOWEL HABIT: Status: ACTIVE | Noted: 2020-11-23

## 2021-12-14 PROBLEM — 166318006 BLOOD CHEMISTRY ABNORMAL: Status: ACTIVE | Noted: 2020-07-26

## 2021-12-14 PROCEDURE — 99213 OFFICE O/P EST LOW 20 MIN: CPT | Performed by: INTERNAL MEDICINE

## 2021-12-14 RX ORDER — THIAMINE HCL 100 MG
1 TABLET WITH A MEAL TABLET ORAL ONCE A DAY
Qty: 30 | Status: ACTIVE | COMMUNITY

## 2021-12-14 RX ORDER — PANTOPRAZOLE SODIUM 40 MG/1
1 TABLET TABLET, DELAYED RELEASE ORAL ONCE A DAY
Qty: 90 TABLET | Refills: 0 | Status: ACTIVE | COMMUNITY
Start: 2021-12-01

## 2021-12-14 RX ORDER — COLCHICINE 0.6 MG/1
1 TABLET TABLET, FILM COATED ORAL
Qty: 15 | Status: ACTIVE | COMMUNITY

## 2021-12-14 RX ORDER — AMLODIPINE BESYLATE 5 MG/1
1 TABLET TABLET ORAL ONCE A DAY
Qty: 30 | Status: ACTIVE | COMMUNITY

## 2021-12-14 RX ORDER — FEBUXOSTAT 40 MG/1
1 TABLET TABLET ORAL ONCE A DAY
Qty: 30 | Status: ACTIVE | COMMUNITY

## 2021-12-14 NOTE — HPI-ELEVATED LIVER ENZYMES
He continues to deny symptoms of jaundice, chills, RUQ pains, dizziness, easy bruising, or increase fatigue.  Denies alcohol use.  Last visit (8/23/2021) Patient had labs completed with results listed below. He denies any symptoms of jaundice, chills, RUQ pains, dizziness, easy bruising, or increase fatigue.              RISK FACTORS: Denies drug use, travel outside the [place], NSAIDs, protein supplements, tattoos, piercings,  service, blood transfusion, family history of liver disease or cancer, personal exposure to liver diseases, prison, rehab.        Admits occasional alcohol use

## 2021-12-14 NOTE — HPI-CIRRHOSIS
Patient presents today after his EGD, labs and follow-up visit for Cirrhosis.   He denies any complications following his procedure.    Patient currently denies jaundice, chills, RUQ pains, dizziness, easy bruising, or increase fatigue.   Patient report on Sunday he experienced a dull  ache in RLQ, right and left side of his back.  During that time he felt chills.  Symptoms persist for 2 hours then was gone when woke up the next day.   Currently, admits  1 formed bowel movement per day without strain.  Stools are .  Denies melena, blood or mucus in stools, rectal pain/bleeding or pruritus ani.    Last visit (8/23/2021) Patient presents today for a follow-up visit for Cirrhosis. He denies any associated symptoms at this time.          MELD Score: 8 (Labs from 8/12/2021)             Complications of liver disease:         Jaundice: No        Hepatic Encephalopathy: No        Ascites: No        Spontaneous Bacterial Peritonitis: No        Variceal Hemorrhage: No        Portal Vein Thrombosis: No        Muscle Mass Loss: No        Weight loss: No        Sleep Habits: are good, sleeping 6 consecutive hours per night.

## 2022-01-03 ENCOUNTER — LAB OUTSIDE AN ENCOUNTER (OUTPATIENT)
Dept: URBAN - METROPOLITAN AREA CLINIC 33 | Facility: CLINIC | Age: 62
End: 2022-01-03

## 2022-01-06 ENCOUNTER — OFFICE VISIT (OUTPATIENT)
Dept: URBAN - METROPOLITAN AREA CLINIC 33 | Facility: CLINIC | Age: 62
End: 2022-01-06

## 2022-02-07 ENCOUNTER — LAB OUTSIDE AN ENCOUNTER (OUTPATIENT)
Dept: URBAN - METROPOLITAN AREA CLINIC 31 | Facility: CLINIC | Age: 62
End: 2022-02-07

## 2022-02-21 ENCOUNTER — TELEPHONE ENCOUNTER (OUTPATIENT)
Dept: URBAN - METROPOLITAN AREA MEDICAL CENTER 28 | Facility: MEDICAL CENTER | Age: 62
End: 2022-02-21

## 2022-02-23 ENCOUNTER — OFFICE VISIT (OUTPATIENT)
Dept: URBAN - METROPOLITAN AREA MEDICAL CENTER 28 | Facility: MEDICAL CENTER | Age: 62
End: 2022-02-23

## 2022-03-14 ENCOUNTER — TELEPHONE ENCOUNTER (OUTPATIENT)
Dept: URBAN - METROPOLITAN AREA CLINIC 33 | Facility: CLINIC | Age: 62
End: 2022-03-14

## 2022-03-15 ENCOUNTER — ERX REFILL RESPONSE (OUTPATIENT)
Dept: URBAN - METROPOLITAN AREA CLINIC 35 | Facility: CLINIC | Age: 62
End: 2022-03-15

## 2022-03-15 RX ORDER — PANTOPRAZOLE SODIUM 40 MG/1
1 TABLET TABLET, DELAYED RELEASE ORAL ONCE A DAY
Qty: 90 TABLET | Refills: 0 | OUTPATIENT

## 2022-03-15 RX ORDER — PANTOPRAZOLE SODIUM 40 MG/1
TAKE ONE TABLET BY MOUTH DAILY TABLET, DELAYED RELEASE ORAL
Qty: 90 TABLET | Refills: 4 | OUTPATIENT

## 2022-03-17 ENCOUNTER — OFFICE VISIT (OUTPATIENT)
Dept: URBAN - METROPOLITAN AREA CLINIC 33 | Facility: CLINIC | Age: 62
End: 2022-03-17

## 2022-03-24 ENCOUNTER — ERX REFILL RESPONSE (OUTPATIENT)
Dept: URBAN - METROPOLITAN AREA CLINIC 35 | Facility: CLINIC | Age: 62
End: 2022-03-24

## 2022-03-24 RX ORDER — PANTOPRAZOLE SODIUM 40 MG/1
TAKE ONE TABLET BY MOUTH DAILY TABLET, DELAYED RELEASE ORAL
Qty: 90 TABLET | Refills: 4 | OUTPATIENT

## 2022-03-24 RX ORDER — PANTOPRAZOLE SODIUM 40 MG/1
TAKE ONE TABLET BY MOUTH DAILY TABLET, DELAYED RELEASE ORAL
Qty: 90 TABLET | Refills: 2 | OUTPATIENT

## 2022-03-30 ENCOUNTER — OFFICE VISIT (OUTPATIENT)
Dept: URBAN - METROPOLITAN AREA MEDICAL CENTER 28 | Facility: MEDICAL CENTER | Age: 62
End: 2022-03-30
Payer: COMMERCIAL

## 2022-03-30 DIAGNOSIS — R93.3 ABN FINDINGS-GI TRACT: ICD-10-CM

## 2022-03-30 DIAGNOSIS — K31.89 ACQUIRED DEFORMITY OF DUODENUM: ICD-10-CM

## 2022-03-30 PROCEDURE — 43259 EGD US EXAM DUODENUM/JEJUNUM: CPT | Performed by: INTERNAL MEDICINE

## 2022-03-30 RX ORDER — PANTOPRAZOLE SODIUM 40 MG/1
TAKE ONE TABLET BY MOUTH DAILY TABLET, DELAYED RELEASE ORAL
Qty: 90 TABLET | Refills: 2 | Status: ACTIVE | COMMUNITY

## 2022-03-30 RX ORDER — AMLODIPINE BESYLATE 5 MG/1
1 TABLET TABLET ORAL ONCE A DAY
Qty: 30 | Status: ACTIVE | COMMUNITY

## 2022-03-30 RX ORDER — FEBUXOSTAT 40 MG/1
1 TABLET TABLET ORAL ONCE A DAY
Qty: 30 | Status: ACTIVE | COMMUNITY

## 2022-03-30 RX ORDER — THIAMINE HCL 100 MG
1 TABLET WITH A MEAL TABLET ORAL ONCE A DAY
Qty: 30 | Status: ACTIVE | COMMUNITY

## 2022-03-30 RX ORDER — COLCHICINE 0.6 MG/1
1 TABLET TABLET, FILM COATED ORAL
Qty: 15 | Status: ACTIVE | COMMUNITY

## 2022-05-04 PROBLEM — 92411005 BENIGN NEOPLASM OF STOMACH: Status: ACTIVE | Noted: 2021-12-14

## 2022-05-04 PROBLEM — 196735001 CSG - CHRONIC SUPERFICIAL GASTRITIS: Status: ACTIVE | Noted: 2021-12-14

## 2022-05-04 PROBLEM — 14223005 ESOPHAGEAL VARICES WITHOUT BLEEDING: Status: ACTIVE | Noted: 2020-07-20

## 2022-05-04 PROBLEM — 19943007 CIRRHOSIS OF LIVER: Status: ACTIVE | Noted: 2020-07-20

## 2022-05-04 PROBLEM — 266433003: Status: ACTIVE | Noted: 2021-12-14

## 2022-05-04 PROBLEM — 197321007 FATTY LIVER: Status: ACTIVE | Noted: 2020-07-20

## 2022-05-05 ENCOUNTER — DASHBOARD ENCOUNTERS (OUTPATIENT)
Age: 62
End: 2022-05-05

## 2022-05-05 ENCOUNTER — OFFICE VISIT (OUTPATIENT)
Dept: URBAN - METROPOLITAN AREA CLINIC 33 | Facility: CLINIC | Age: 62
End: 2022-05-05
Payer: COMMERCIAL

## 2022-05-05 VITALS
WEIGHT: 210 LBS | HEIGHT: 72 IN | SYSTOLIC BLOOD PRESSURE: 148 MMHG | DIASTOLIC BLOOD PRESSURE: 98 MMHG | OXYGEN SATURATION: 97 % | HEART RATE: 88 BPM | BODY MASS INDEX: 28.44 KG/M2

## 2022-05-05 DIAGNOSIS — I85.00 ESOPHAGEAL VARICES WITHOUT BLEEDING: ICD-10-CM

## 2022-05-05 DIAGNOSIS — K74.69 OTHER CIRRHOSIS OF LIVER: ICD-10-CM

## 2022-05-05 DIAGNOSIS — K31.7 POLYP OF STOMACH AND DUODENUM: ICD-10-CM

## 2022-05-05 DIAGNOSIS — R19.4 CHANGE IN BOWEL HABIT: ICD-10-CM

## 2022-05-05 DIAGNOSIS — K76.0 FATTY (CHANGE OF) LIVER, NOT ELSEWHERE CLASSIFIED: ICD-10-CM

## 2022-05-05 DIAGNOSIS — R79.89 OTHER SPECIFIED ABNORMAL FINDINGS OF BLOOD CHEMISTRY: ICD-10-CM

## 2022-05-05 DIAGNOSIS — K29.30 CHRONIC SUPERFICIAL GASTRITIS WITHOUT BLEEDING: ICD-10-CM

## 2022-05-05 DIAGNOSIS — K21.00 GASTROESOPHAGEAL REFLUX DISEASE WITH ESOPHAGITIS WITHOUT HEMORRHAGE: ICD-10-CM

## 2022-05-05 PROCEDURE — 99214 OFFICE O/P EST MOD 30 MIN: CPT | Performed by: INTERNAL MEDICINE

## 2022-05-05 RX ORDER — PANTOPRAZOLE SODIUM 40 MG/1
TAKE ONE TABLET BY MOUTH DAILY TABLET, DELAYED RELEASE ORAL
Qty: 90 TABLET | Refills: 2 | Status: ACTIVE | COMMUNITY

## 2022-05-05 RX ORDER — COLCHICINE 0.6 MG/1
1 TABLET TABLET, FILM COATED ORAL
Qty: 15 | Status: ACTIVE | COMMUNITY

## 2022-05-05 RX ORDER — THIAMINE HCL 100 MG
1 TABLET WITH A MEAL TABLET ORAL ONCE A DAY
Qty: 30 | Status: DISCONTINUED | COMMUNITY

## 2022-05-05 RX ORDER — OXYCODONE HYDROCHLORIDE AND ACETAMINOPHEN 500 MG
1 TABLET TABLET ORAL ONCE A DAY
Status: ACTIVE | COMMUNITY

## 2022-05-05 RX ORDER — FEBUXOSTAT 40 MG/1
1 TABLET TABLET ORAL ONCE A DAY
Qty: 30 | Status: DISCONTINUED | COMMUNITY

## 2022-05-05 RX ORDER — AMLODIPINE BESYLATE 5 MG/1
1 TABLET TABLET ORAL ONCE A DAY
Qty: 30 | Status: ACTIVE | COMMUNITY

## 2022-05-05 NOTE — HPI-CIRRHOSIS
Patient presents today for a follow up of cirrhosis. He denies jaundice, chills, RUQ pains, dizziness, easy bruising, or increase fatigue.   MELD Score:  7 (Labs from 3/21/2022, INR from (8/14/202)             Complications of liver disease:         Jaundice: No        Hepatic Encephalopathy: No        Ascites: No        Spontaneous Bacterial Peritonitis: No        Variceal Hemorrhage: No        Portal Vein Thrombosis: No        Muscle Mass Loss: No        Weight loss: No        Sleep Habits: are good, sleeping 5-6 consecutive hours per night.   Last visit (12/14/2021)  Patient presents today after his EGD, labs and follow-up visit for Cirrhosis.   He denies any complications following his procedure.    Patient currently denies jaundice, chills, RUQ pains, dizziness, easy bruising, or increase fatigue.   Patient report on Sunday he experienced a dull  ache in RLQ, right and left side of his back.  During that time he felt chills.  Symptoms persist for 2 hours then was gone when woke up the next day.   Currently, admit 1 formed bowel movement per day without strain.  Stools are formed to pebble-like.  Denies melena, blood or mucus in stools, rectal pain/bleeding or pruritus ani.     MELD Score: 8 (Labs from 8/12/2021)             Complications of liver disease:         Jaundice: No        Hepatic Encephalopathy: No        Ascites: No        Spontaneous Bacterial Peritonitis: No        Variceal Hemorrhage: No        Portal Vein Thrombosis: No        Muscle Mass Loss: No        Weight loss: No        Sleep Habits: are good, sleeping 6 consecutive hours per night.

## 2022-05-05 NOTE — HPI-ELEVATED LIVER ENZYMES
Most recent labs completed with PCP (3/21/2022) CMP: Alk phos (High) 150, AST (High) 37, ALT (High) 127, Total Bilirubin (WNL) 1.2  Continue to deny alcohol use. Last visit (12/14/2021)  He continues to deny symptoms of jaundice, chills, RUQ pains, dizziness, easy bruising, or increase fatigue.  Denies alcohol use.

## 2022-05-12 ENCOUNTER — LAB OUTSIDE AN ENCOUNTER (OUTPATIENT)
Dept: URBAN - METROPOLITAN AREA CLINIC 35 | Facility: CLINIC | Age: 62
End: 2022-05-12

## 2022-05-13 LAB
A/G RATIO: 1.3
AFP, SERUM, TUMOR MARKER: 7.7
ALBUMIN: 4.4
ALKALINE PHOSPHATASE: 97
ALT (SGPT): 134
AST (SGOT): 66
BILIRUBIN, TOTAL: 0.9
BUN/CREATININE RATIO: (no result)
BUN: 13
CALCIUM: 9.8
CARBON DIOXIDE, TOTAL: 26
CHLORIDE: 103
CREATININE: 1.12
EGFR AFRICAN AMERICAN: 82
EGFR NON-AFR. AMERICAN: 71
GLOBULIN, TOTAL: 3.3
GLUCOSE: 96
INR: 1
POTASSIUM: 3.9
PROTEIN, TOTAL: 7.7
PT: 10.3
SODIUM: 140

## 2022-10-15 ENCOUNTER — LAB OUTSIDE AN ENCOUNTER (OUTPATIENT)
Dept: URBAN - METROPOLITAN AREA CLINIC 33 | Facility: CLINIC | Age: 62
End: 2022-10-15

## 2022-11-03 ENCOUNTER — OFFICE VISIT (OUTPATIENT)
Dept: URBAN - METROPOLITAN AREA CLINIC 33 | Facility: CLINIC | Age: 62
End: 2022-11-03

## 2022-11-07 ENCOUNTER — LAB OUTSIDE AN ENCOUNTER (OUTPATIENT)
Dept: URBAN - METROPOLITAN AREA CLINIC 33 | Facility: CLINIC | Age: 62
End: 2022-11-07

## 2023-04-05 ENCOUNTER — ERX REFILL RESPONSE (OUTPATIENT)
Dept: URBAN - METROPOLITAN AREA CLINIC 33 | Facility: CLINIC | Age: 63
End: 2023-04-05

## 2023-04-05 RX ORDER — PANTOPRAZOLE SODIUM 40 MG/1
TAKE ONE TABLET BY MOUTH DAILY TABLET, DELAYED RELEASE ORAL
Qty: 90 TABLET | Refills: 2 | OUTPATIENT